# Patient Record
Sex: FEMALE | Race: WHITE | NOT HISPANIC OR LATINO | URBAN - METROPOLITAN AREA
[De-identification: names, ages, dates, MRNs, and addresses within clinical notes are randomized per-mention and may not be internally consistent; named-entity substitution may affect disease eponyms.]

---

## 2018-09-27 VITALS
SYSTOLIC BLOOD PRESSURE: 131 MMHG | HEIGHT: 64 IN | TEMPERATURE: 98 F | RESPIRATION RATE: 16 BRPM | HEART RATE: 68 BPM | WEIGHT: 139.33 LBS | DIASTOLIC BLOOD PRESSURE: 71 MMHG | OXYGEN SATURATION: 100 %

## 2018-09-27 NOTE — PRE-OP CHECKLIST - SELECT TESTS ORDERED
Urinalysis/CBC/PT/PTT/BMP/Results in MD note Urinalysis/HCG/Urine HCG Negative/BMP/CBC/PT/PTT/Results in MD note

## 2018-09-28 ENCOUNTER — INPATIENT (INPATIENT)
Facility: HOSPITAL | Age: 38
LOS: 1 days | Discharge: HOME | DRG: 742 | End: 2018-09-30
Attending: OBSTETRICS & GYNECOLOGY | Admitting: OBSTETRICS & GYNECOLOGY
Payer: COMMERCIAL

## 2018-09-28 DIAGNOSIS — Z98.890 OTHER SPECIFIED POSTPROCEDURAL STATES: Chronic | ICD-10-CM

## 2018-09-28 LAB
ANION GAP SERPL CALC-SCNC: 10 MMOL/L — SIGNIFICANT CHANGE UP (ref 5–17)
ANION GAP SERPL CALC-SCNC: 10 MMOL/L — SIGNIFICANT CHANGE UP (ref 5–17)
APTT BLD: 29.1 SEC — SIGNIFICANT CHANGE UP (ref 27.5–37.4)
BLD GP AB SCN SERPL QL: NEGATIVE — SIGNIFICANT CHANGE UP
BUN SERPL-MCNC: 13 MG/DL — SIGNIFICANT CHANGE UP (ref 7–23)
BUN SERPL-MCNC: 14 MG/DL — SIGNIFICANT CHANGE UP (ref 7–23)
CALCIUM SERPL-MCNC: 7.6 MG/DL — LOW (ref 8.4–10.5)
CALCIUM SERPL-MCNC: 7.8 MG/DL — LOW (ref 8.4–10.5)
CHLORIDE SERPL-SCNC: 102 MMOL/L — SIGNIFICANT CHANGE UP (ref 96–108)
CHLORIDE SERPL-SCNC: 102 MMOL/L — SIGNIFICANT CHANGE UP (ref 96–108)
CO2 SERPL-SCNC: 22 MMOL/L — SIGNIFICANT CHANGE UP (ref 22–31)
CO2 SERPL-SCNC: 24 MMOL/L — SIGNIFICANT CHANGE UP (ref 22–31)
CREAT SERPL-MCNC: 0.63 MG/DL — SIGNIFICANT CHANGE UP (ref 0.5–1.3)
CREAT SERPL-MCNC: 0.65 MG/DL — SIGNIFICANT CHANGE UP (ref 0.5–1.3)
FIBRINOGEN PPP-MCNC: 206 MG/DL — LOW (ref 258–438)
GLUCOSE SERPL-MCNC: 178 MG/DL — HIGH (ref 70–99)
GLUCOSE SERPL-MCNC: 189 MG/DL — HIGH (ref 70–99)
HCT VFR BLD CALC: 25.6 % — LOW (ref 34.5–45)
HGB BLD-MCNC: 8.4 G/DL — LOW (ref 11.5–15.5)
INR BLD: 1.17 — HIGH (ref 0.88–1.16)
MCHC RBC-ENTMCNC: 29.8 PG — SIGNIFICANT CHANGE UP (ref 27–34)
MCHC RBC-ENTMCNC: 32.8 G/DL — SIGNIFICANT CHANGE UP (ref 32–36)
MCV RBC AUTO: 90.8 FL — SIGNIFICANT CHANGE UP (ref 80–100)
PLATELET # BLD AUTO: 182 K/UL — SIGNIFICANT CHANGE UP (ref 150–400)
POTASSIUM SERPL-MCNC: 3.9 MMOL/L — SIGNIFICANT CHANGE UP (ref 3.5–5.3)
POTASSIUM SERPL-MCNC: 4.2 MMOL/L — SIGNIFICANT CHANGE UP (ref 3.5–5.3)
POTASSIUM SERPL-SCNC: 3.9 MMOL/L — SIGNIFICANT CHANGE UP (ref 3.5–5.3)
POTASSIUM SERPL-SCNC: 4.2 MMOL/L — SIGNIFICANT CHANGE UP (ref 3.5–5.3)
PROTHROM AB SERPL-ACNC: 13 SEC — HIGH (ref 9.8–12.7)
RBC # BLD: 2.82 M/UL — LOW (ref 3.8–5.2)
RBC # FLD: 12.4 % — SIGNIFICANT CHANGE UP (ref 10.3–16.9)
RH IG SCN BLD-IMP: POSITIVE — SIGNIFICANT CHANGE UP
SODIUM SERPL-SCNC: 134 MMOL/L — LOW (ref 135–145)
SODIUM SERPL-SCNC: 136 MMOL/L — SIGNIFICANT CHANGE UP (ref 135–145)
WBC # BLD: 19.4 K/UL — HIGH (ref 3.8–10.5)
WBC # FLD AUTO: 19.4 K/UL — HIGH (ref 3.8–10.5)

## 2018-09-28 RX ORDER — ONDANSETRON 8 MG/1
4 TABLET, FILM COATED ORAL EVERY 6 HOURS
Qty: 0 | Refills: 0 | Status: DISCONTINUED | OUTPATIENT
Start: 2018-09-28 | End: 2018-09-30

## 2018-09-28 RX ORDER — NALOXONE HYDROCHLORIDE 4 MG/.1ML
0.1 SPRAY NASAL
Qty: 0 | Refills: 0 | Status: DISCONTINUED | OUTPATIENT
Start: 2018-09-28 | End: 2018-09-30

## 2018-09-28 RX ORDER — BUPIVACAINE 13.3 MG/ML
20 INJECTION, SUSPENSION, LIPOSOMAL INFILTRATION ONCE
Qty: 0 | Refills: 0 | Status: DISCONTINUED | OUTPATIENT
Start: 2018-09-28 | End: 2018-09-30

## 2018-09-28 RX ORDER — ONDANSETRON 8 MG/1
4 TABLET, FILM COATED ORAL ONCE
Qty: 0 | Refills: 0 | Status: DISCONTINUED | OUTPATIENT
Start: 2018-09-28 | End: 2018-09-30

## 2018-09-28 RX ORDER — SODIUM CHLORIDE 9 MG/ML
1000 INJECTION, SOLUTION INTRAVENOUS
Qty: 0 | Refills: 0 | Status: DISCONTINUED | OUTPATIENT
Start: 2018-09-28 | End: 2018-09-30

## 2018-09-28 RX ORDER — KETOROLAC TROMETHAMINE 30 MG/ML
30 SYRINGE (ML) INJECTION EVERY 6 HOURS
Qty: 0 | Refills: 0 | Status: DISCONTINUED | OUTPATIENT
Start: 2018-09-28 | End: 2018-09-30

## 2018-09-28 RX ORDER — HYDROMORPHONE HYDROCHLORIDE 2 MG/ML
30 INJECTION INTRAMUSCULAR; INTRAVENOUS; SUBCUTANEOUS
Qty: 0 | Refills: 0 | Status: DISCONTINUED | OUTPATIENT
Start: 2018-09-28 | End: 2018-09-29

## 2018-09-28 RX ORDER — DOCUSATE SODIUM 100 MG
100 CAPSULE ORAL THREE TIMES A DAY
Qty: 0 | Refills: 0 | Status: DISCONTINUED | OUTPATIENT
Start: 2018-09-28 | End: 2018-09-30

## 2018-09-28 RX ORDER — METOCLOPRAMIDE HCL 10 MG
10 TABLET ORAL EVERY 6 HOURS
Qty: 0 | Refills: 0 | Status: DISCONTINUED | OUTPATIENT
Start: 2018-09-28 | End: 2018-09-30

## 2018-09-28 RX ORDER — SIMETHICONE 80 MG/1
80 TABLET, CHEWABLE ORAL EVERY 8 HOURS
Qty: 0 | Refills: 0 | Status: DISCONTINUED | OUTPATIENT
Start: 2018-09-28 | End: 2018-09-30

## 2018-09-28 RX ADMIN — Medication 30 MILLIGRAM(S): at 15:00

## 2018-09-28 RX ADMIN — Medication 30 MILLIGRAM(S): at 22:04

## 2018-09-28 RX ADMIN — HYDROMORPHONE HYDROCHLORIDE 30 MILLILITER(S): 2 INJECTION INTRAMUSCULAR; INTRAVENOUS; SUBCUTANEOUS at 15:26

## 2018-09-28 RX ADMIN — ONDANSETRON 4 MILLIGRAM(S): 8 TABLET, FILM COATED ORAL at 22:09

## 2018-09-28 RX ADMIN — Medication 30 MILLIGRAM(S): at 14:45

## 2018-09-28 RX ADMIN — SIMETHICONE 80 MILLIGRAM(S): 80 TABLET, CHEWABLE ORAL at 22:03

## 2018-09-28 RX ADMIN — Medication 30 MILLIGRAM(S): at 21:45

## 2018-09-28 NOTE — H&P ADULT - HISTORY OF PRESENT ILLNESS
37 yo  presents for scheduled myomectomy. Patient previously told by prior doctor to have only 3 fibroids on ultrasound. However, in past few days an MRI revealed >11 fibroids presents. Patient desires fertility. She has a history of an open myomectomy in .   Gynhx: denies  PMH: denes  PSH: open myomectomy   Meds: Valtrex prn  Allergic to amoxicillin--rash and diarrhea

## 2018-09-28 NOTE — H&P ADULT - ASSESSMENT
37 yo  here for scheduled myomectomy.  -Admitted to preop  -discussed abx with anesthesia--will give cefotetan for low cross reactivity w/ amoxicillin  -NPO  -IV hydration  -Cell saver available

## 2018-09-28 NOTE — PROGRESS NOTE ADULT - ASSESSMENT
38y Female POD#0    s/p abdominal myomectomy                                         1. Neuro/Pain:  PCA, d/c PCA in AM and transition to oral pain medications   2  CV:   VS per routine  3. Pulm: Encourage ISS  4. GI: tolerating sips of clears, ADAT    5. :  Mari in place, TOV in AM  6. Heme: CBC in PACU 8.4, repeat AM CBC  7. ID: --  8. DVT ppx: SCDs  9. Dispo: Likely POD#1 or 2

## 2018-09-28 NOTE — H&P ADULT - NSHPPHYSICALEXAM_GEN_ALL_CORE
Vital Signs Last 24 Hrs  T(C): --  T(F): --  HR: 68 (28 Sep 2018 15:00) (68 - 78)  BP: 98/58 (28 Sep 2018 15:00) (97/55 - 101/60)  BP(mean): 68 (28 Sep 2018 15:00) (66 - 73)  RR: 16 (28 Sep 2018 15:00) (11 - 16)  SpO2: 100% (28 Sep 2018 15:00) (100% - 100%)    Gen NAD, AOx3  Chest normal work of breathing  Abdomen: uterus palpable at umbilicus, lower abdomen slightly distended, nontender  Skin: 3 wheals left of umbilicus noted after induction of anesthesia but prior to antibiotic administration and skin prep  Extremities: no edema

## 2018-09-29 LAB
HCT VFR BLD CALC: 20.4 % — CRITICAL LOW (ref 34.5–45)
HGB BLD-MCNC: 6.5 G/DL — CRITICAL LOW (ref 11.5–15.5)
MCHC RBC-ENTMCNC: 29.1 PG — SIGNIFICANT CHANGE UP (ref 27–34)
MCHC RBC-ENTMCNC: 31.9 G/DL — LOW (ref 32–36)
MCV RBC AUTO: 91.5 FL — SIGNIFICANT CHANGE UP (ref 80–100)
PLATELET # BLD AUTO: 165 K/UL — SIGNIFICANT CHANGE UP (ref 150–400)
RBC # BLD: 2.23 M/UL — LOW (ref 3.8–5.2)
RBC # FLD: 12.7 % — SIGNIFICANT CHANGE UP (ref 10.3–16.9)
WBC # BLD: 15 K/UL — HIGH (ref 3.8–10.5)
WBC # FLD AUTO: 15 K/UL — HIGH (ref 3.8–10.5)

## 2018-09-29 RX ORDER — ACETAMINOPHEN 500 MG
650 TABLET ORAL ONCE
Qty: 0 | Refills: 0 | Status: COMPLETED | OUTPATIENT
Start: 2018-09-29 | End: 2018-09-29

## 2018-09-29 RX ORDER — ACETAMINOPHEN 500 MG
650 TABLET ORAL EVERY 6 HOURS
Qty: 0 | Refills: 0 | Status: DISCONTINUED | OUTPATIENT
Start: 2018-09-29 | End: 2018-09-30

## 2018-09-29 RX ADMIN — SIMETHICONE 80 MILLIGRAM(S): 80 TABLET, CHEWABLE ORAL at 21:15

## 2018-09-29 RX ADMIN — Medication 30 MILLIGRAM(S): at 04:21

## 2018-09-29 RX ADMIN — Medication 30 MILLIGRAM(S): at 20:49

## 2018-09-29 RX ADMIN — Medication 650 MILLIGRAM(S): at 22:14

## 2018-09-29 RX ADMIN — Medication 30 MILLIGRAM(S): at 03:20

## 2018-09-29 RX ADMIN — SIMETHICONE 80 MILLIGRAM(S): 80 TABLET, CHEWABLE ORAL at 14:00

## 2018-09-29 RX ADMIN — SODIUM CHLORIDE 125 MILLILITER(S): 9 INJECTION, SOLUTION INTRAVENOUS at 04:21

## 2018-09-29 RX ADMIN — SIMETHICONE 80 MILLIGRAM(S): 80 TABLET, CHEWABLE ORAL at 06:02

## 2018-09-29 RX ADMIN — Medication 30 MILLIGRAM(S): at 21:14

## 2018-09-29 RX ADMIN — Medication 30 MILLIGRAM(S): at 10:09

## 2018-09-29 RX ADMIN — Medication 10 MILLIGRAM(S): at 03:04

## 2018-09-29 RX ADMIN — Medication 650 MILLIGRAM(S): at 21:14

## 2018-09-29 RX ADMIN — ONDANSETRON 4 MILLIGRAM(S): 8 TABLET, FILM COATED ORAL at 06:02

## 2018-09-29 NOTE — PROGRESS NOTE ADULT - ASSESSMENT
38y Female POD# 1   s/p  abdominal myomectomy of approximately 24 fibroids.                                       1. Neuro/Pain:  pain well controlled w/ toradol, has not been using PCA, thus d/c PCA, and transition to tylenol and toradol only   2  CV:   VS per routine  3. Pulm: Encourage ISS  4. GI: tolerating clears, ADAT  5. :  s/p pearce, f/u TOV  6. Heme: PACU CBC 8.4, AM CBC 6.5, reported weakness early today, but symptoms have resolved, consented for blood and 2upRBC placed on hold, reevaluate if pRBC needed    7. ID: --  8. DVT ppx: SCDs  9. Dispo: Likely POD#2

## 2018-09-29 NOTE — PROVIDER CONTACT NOTE (CRITICAL VALUE NOTIFICATION) - SITUATION
Patient is 12 hours post op for abd myomectomy with removal of 24 fibroids  Hgb/Hct eleven hours prior = 8.4/25.6

## 2018-09-29 NOTE — PROVIDER CONTACT NOTE (CRITICAL VALUE NOTIFICATION) - ACTION/TREATMENT ORDERED:
Consent obtained from patient for Blood transfusion today. Form signed by Dr. Rosario , and Patient, and witnessed by MANUEL Cardenas.

## 2018-09-29 NOTE — PROVIDER CONTACT NOTE (CRITICAL VALUE NOTIFICATION) - ASSESSMENT
Nauseated  most of night treated with Reglan and Zofran; Dizzy upon standing at bedside for the first tie this morning.

## 2018-09-29 NOTE — PROGRESS NOTE ADULT - ASSESSMENT
39yo POD#1 s/p abdominal myomectomy, febrile   - patient now s/p 2 u PRBC due to anemia  - fever possibly secondary to blood transfusion vs. atelectasis vs UTI  - UA, urine culture to be collected at this time  - incentive spirometer use encouraged  - will administer tylenol PO and recheck temperature in 1 hour   - posttransfusion CBC at midnight

## 2018-09-30 VITALS
SYSTOLIC BLOOD PRESSURE: 125 MMHG | OXYGEN SATURATION: 100 % | DIASTOLIC BLOOD PRESSURE: 78 MMHG | RESPIRATION RATE: 18 BRPM | HEART RATE: 86 BPM | TEMPERATURE: 98 F

## 2018-09-30 LAB
APPEARANCE UR: CLEAR — SIGNIFICANT CHANGE UP
BILIRUB UR-MCNC: NEGATIVE — SIGNIFICANT CHANGE UP
COLOR SPEC: YELLOW — SIGNIFICANT CHANGE UP
DIFF PNL FLD: ABNORMAL
GLUCOSE UR QL: NEGATIVE — SIGNIFICANT CHANGE UP
HCT VFR BLD CALC: 25.2 % — LOW (ref 34.5–45)
HGB BLD-MCNC: 8.3 G/DL — LOW (ref 11.5–15.5)
KETONES UR-MCNC: NEGATIVE — SIGNIFICANT CHANGE UP
LEUKOCYTE ESTERASE UR-ACNC: NEGATIVE — SIGNIFICANT CHANGE UP
MCHC RBC-ENTMCNC: 29.2 PG — SIGNIFICANT CHANGE UP (ref 27–34)
MCHC RBC-ENTMCNC: 32.9 G/DL — SIGNIFICANT CHANGE UP (ref 32–36)
MCV RBC AUTO: 88.7 FL — SIGNIFICANT CHANGE UP (ref 80–100)
NITRITE UR-MCNC: NEGATIVE — SIGNIFICANT CHANGE UP
PH UR: 6 — SIGNIFICANT CHANGE UP (ref 5–8)
PLATELET # BLD AUTO: 151 K/UL — SIGNIFICANT CHANGE UP (ref 150–400)
PROT UR-MCNC: NEGATIVE MG/DL — SIGNIFICANT CHANGE UP
RBC # BLD: 2.84 M/UL — LOW (ref 3.8–5.2)
RBC # FLD: 13.6 % — SIGNIFICANT CHANGE UP (ref 10.3–16.9)
SP GR SPEC: <=1.005 — SIGNIFICANT CHANGE UP (ref 1–1.03)
UROBILINOGEN FLD QL: 0.2 E.U./DL — SIGNIFICANT CHANGE UP
WBC # BLD: 12.9 K/UL — HIGH (ref 3.8–10.5)
WBC # FLD AUTO: 12.9 K/UL — HIGH (ref 3.8–10.5)

## 2018-09-30 RX ORDER — OXYCODONE AND ACETAMINOPHEN 5; 325 MG/1; MG/1
1 TABLET ORAL EVERY 4 HOURS
Qty: 0 | Refills: 0 | Status: DISCONTINUED | OUTPATIENT
Start: 2018-09-30 | End: 2018-09-30

## 2018-09-30 RX ORDER — SODIUM CHLORIDE 9 MG/ML
3 INJECTION INTRAMUSCULAR; INTRAVENOUS; SUBCUTANEOUS EVERY 8 HOURS
Qty: 0 | Refills: 0 | Status: DISCONTINUED | OUTPATIENT
Start: 2018-09-30 | End: 2018-09-30

## 2018-09-30 RX ADMIN — OXYCODONE AND ACETAMINOPHEN 1 TABLET(S): 5; 325 TABLET ORAL at 11:35

## 2018-09-30 RX ADMIN — SIMETHICONE 80 MILLIGRAM(S): 80 TABLET, CHEWABLE ORAL at 06:23

## 2018-09-30 NOTE — DISCHARGE NOTE ADULT - HOSPITAL COURSE
Postoperative course complicated by anemia of 6.5, patient received 2u blood transfusion and hemoglobin nehemiah appropriately.  Patient discharged home in stable condition.

## 2018-09-30 NOTE — DISCHARGE NOTE ADULT - CARE PROVIDER_API CALL
Yariel Price), Obstetrics and Gynecology  [address] [place] [address]  Phone: [phone]  Fax: [phone]

## 2018-09-30 NOTE — DISCHARGE NOTE ADULT - PATIENT PORTAL LINK FT
You can access the [facility] Patient Portal, offered by [facility], by registering with the following website: [email or web address]

## 2018-09-30 NOTE — DISCHARGE NOTE ADULT - CARE PLAN
Principal Discharge DX:	H/O myomectomy  Goal:	Healthy recovery  Assessment and plan of treatment:	No heavy lifting, nothing per vagina, no swimming for 2 weeks.  Follow up with Dr. Price in office in 2 weeks.

## 2018-09-30 NOTE — PROGRESS NOTE ADULT - REASON FOR ADMISSION
status post abdominal myomectomy

## 2018-09-30 NOTE — DISCHARGE NOTE ADULT - PLAN OF CARE
Healthy recovery No heavy lifting, nothing per vagina, no swimming for 2 weeks.  Follow up with Dr. Price in office in 2 weeks.

## 2018-09-30 NOTE — PROGRESS NOTE ADULT - ASSESSMENT
38y Female POD# 2   s/p  abdominal myomectomy of approximately 24 fibroids.                                       1. Neuro/Pain:  pain well controlled w/ tylenol  2  CV:   VS per routine  3. Pulm: Encourage ISS  4. GI: tolerating clears, ADAT  5. :  s/p pearce, f/u TOV  6. Heme: patient received 2 units of blood overnight and hemoglobin nehemiah appropriately to 8.3; patient asymptomatic this morning.  7. ID: one time low grade fever overnight that subsequently resolved with tylenol and patient is asymptomatic; UA negative  8. DVT ppx: SCDs  9. Dispo: today

## 2018-10-01 PROCEDURE — 81001 URINALYSIS AUTO W/SCOPE: CPT

## 2018-10-01 PROCEDURE — 80048 BASIC METABOLIC PNL TOTAL CA: CPT

## 2018-10-01 PROCEDURE — 85730 THROMBOPLASTIN TIME PARTIAL: CPT

## 2018-10-01 PROCEDURE — 87086 URINE CULTURE/COLONY COUNT: CPT

## 2018-10-01 PROCEDURE — 85384 FIBRINOGEN ACTIVITY: CPT

## 2018-10-01 PROCEDURE — 36430 TRANSFUSION BLD/BLD COMPNT: CPT

## 2018-10-01 PROCEDURE — 88305 TISSUE EXAM BY PATHOLOGIST: CPT

## 2018-10-01 PROCEDURE — 86900 BLOOD TYPING SEROLOGIC ABO: CPT

## 2018-10-01 PROCEDURE — 86850 RBC ANTIBODY SCREEN: CPT

## 2018-10-01 PROCEDURE — 86923 COMPATIBILITY TEST ELECTRIC: CPT

## 2018-10-01 PROCEDURE — 85610 PROTHROMBIN TIME: CPT

## 2018-10-01 PROCEDURE — 86901 BLOOD TYPING SEROLOGIC RH(D): CPT

## 2018-10-01 PROCEDURE — C1765: CPT

## 2018-10-01 PROCEDURE — 85027 COMPLETE CBC AUTOMATED: CPT

## 2018-10-01 PROCEDURE — 36415 COLL VENOUS BLD VENIPUNCTURE: CPT

## 2018-10-01 PROCEDURE — P9016: CPT

## 2018-10-02 LAB
CULTURE RESULTS: SIGNIFICANT CHANGE UP
SPECIMEN SOURCE: SIGNIFICANT CHANGE UP

## 2018-10-03 LAB — SURGICAL PATHOLOGY STUDY: SIGNIFICANT CHANGE UP

## 2018-10-09 DIAGNOSIS — D25.0 SUBMUCOUS LEIOMYOMA OF UTERUS: ICD-10-CM

## 2018-10-09 DIAGNOSIS — D25.2 SUBSEROSAL LEIOMYOMA OF UTERUS: ICD-10-CM

## 2018-10-09 DIAGNOSIS — D62 ACUTE POSTHEMORRHAGIC ANEMIA: ICD-10-CM

## 2018-10-09 DIAGNOSIS — R50.9 FEVER, UNSPECIFIED: ICD-10-CM

## 2018-10-09 DIAGNOSIS — N83.201 UNSPECIFIED OVARIAN CYST, RIGHT SIDE: ICD-10-CM

## 2018-10-09 DIAGNOSIS — Z53.31 LAPAROSCOPIC SURGICAL PROCEDURE CONVERTED TO OPEN PROCEDURE: ICD-10-CM

## 2018-10-09 DIAGNOSIS — Z88.1 ALLERGY STATUS TO OTHER ANTIBIOTIC AGENTS STATUS: ICD-10-CM

## 2021-11-01 NOTE — BRIEF OPERATIVE NOTE - OPERATION/FINDINGS
1 week 20 cm fibroid uterus w/ 24 fibroids removed, endometrial cavity entered and patient should never labor, multiple subserosal and submucosal fibroids, r. ovarian hemorrhagic cyst

## 2022-05-06 NOTE — PROGRESS NOTE ADULT - SUBJECTIVE AND OBJECTIVE BOX
C/o incisional pain  AVSS  Abdomen soft, no rebound  Ext No C/C/E  Voiding freely  Hb 6.5, 8.4 last night probably dilutional    Plan  Transfuse 2 u prbc  Reg diet  May D/C home in AM
GYN POC   Patient seen at bedside in PACU. Was sleeping comfortable in bed. Pain well controlled with PCA. Tolerating sips.  No OOB yet.  Mari in place. Denies CP, palpitations, SOB, fever, chills, nausea, vomiting.    Vital Signs Last 24 Hrs  T(C): 36.9 (28 Sep 2018 19:00), Max: 36.9 (28 Sep 2018 19:00)  T(F): 98.5 (28 Sep 2018 19:00), Max: 98.5 (28 Sep 2018 19:00)  HR: 90 (28 Sep 2018 20:06) (68 - 92)  BP: 104/64 (28 Sep 2018 19:00) (97/55 - 107/63)  BP(mean): 77 (28 Sep 2018 19:00) (66 - 78)  RR: 16 (28 Sep 2018 20:06) (11 - 20)  SpO2: 99% (28 Sep 2018 20:06) (99% - 100%)    Physical Exam:  Gen: No Acute Distress  Pulm: Clear to auscultation bilaterally  GI: soft, appropriately tender, non-distended, +BS, no rebound, no guarding.  Dressing C/D/I  : Mari in place  Ext: SCDs in place, no edema/erythema/tenderness    I&O's Summary    28 Sep 2018 07:01  -  28 Sep 2018 20:23  --------------------------------------------------------  IN: 3185 mL / OUT: 1760 mL / NET: 1425 mL      MEDICATIONS  (STANDING):  BUpivacaine liposome 1.3% Injectable (no eMAR) 20 milliLiter(s) Local Injection Once  HYDROmorphone PCA (1 mG/mL) 30 milliLiter(s) PCA Continuous PCA Continuous  ketorolac   Injectable 30 milliGRAM(s) IV Push every 6 hours  lactated ringers. 1000 milliLiter(s) (125 mL/Hr) IV Continuous <Continuous>  simethicone 80 milliGRAM(s) Chew every 8 hours    MEDICATIONS  (PRN):  docusate sodium 100 milliGRAM(s) Oral three times a day PRN Stool Softening  metoclopramide Injectable 10 milliGRAM(s) IV Push every 6 hours PRN Nausea and/or Vomiting  naloxone Injectable 0.1 milliGRAM(s) IV Push every 3 minutes PRN For ANY of the following changes in patient status:  A. RR LESS THAN 10 breaths per minute, B. Oxygen saturation LESS THAN 90%, C. Sedation score of 6  ondansetron Injectable 4 milliGRAM(s) IV Push every 6 hours PRN Nausea  ondansetron Injectable 4 milliGRAM(s) IV Push once PRN Postoperative Nausea and/or Vomiting    Allergies    amoxicillin (Diarrhea; Rash)    Intolerances        LABS:                        8.4    19.4  )-----------( 182      ( 28 Sep 2018 18:44 )             25.6     09-28    136  |  102  |  14  ----------------------------<  178<H>  4.2   |  24  |  0.65    Ca    7.8<L>      28 Sep 2018 18:44      PT/INR - ( 28 Sep 2018 18:45 )   PT: 13.0 sec;   INR: 1.17          PTT - ( 28 Sep 2018 18:45 )  PTT:29.1 sec
GYN Progress Note    Patient seen at bedside this morning. Pearce was removed this morning, has not urinated this morning.   Pain is well controlled on toradol, only used PCA a few times overnight.   Tolerating clear liquids this morning. Overnight had nausea, improved with zofran. Has not had vomiting.   Ambulated this morning. States that when she first got up she felt weak, but has not felt weak since and feels well. States she went for a walk and had no SOB, weakness, CP, lightheaded.   Denies CP, palpitations, SOB, fever, chills, nausea, vomiting.    Vital Signs Last 24 Hrs  T(C): 37.3 (29 Sep 2018 05:41), Max: 37.3 (29 Sep 2018 05:41)  T(F): 99.1 (29 Sep 2018 05:41), Max: 99.1 (29 Sep 2018 05:41)  HR: 96 (29 Sep 2018 05:41) (68 - 96)  BP: 104/64 (29 Sep 2018 05:41) (97/55 - 116/69)  BP(mean): 72 (28 Sep 2018 20:12) (66 - 78)  RR: 17 (29 Sep 2018 05:41) (11 - 20)  SpO2: 100% (29 Sep 2018 05:41) (98% - 100%)    Physical Exam:  Gen: No Acute Distress, resting comfortable in bed  Pulm: Normal work of breathing, no wheezes/rhonchi/rales   GI: soft, appropriately tender, nondistended, +BS, no rebound, no guarding, Incision site clean/dry/intact   : s/p pearce, no vaginal bleeding   Ext: SCDs in place, wnl    I&O's Summary    28 Sep 2018 07:01  -  29 Sep 2018 07:00  --------------------------------------------------------  IN: 4435 mL / OUT: 2860 mL / NET: 1575 mL      MEDICATIONS  (STANDING):  BUpivacaine liposome 1.3% Injectable (no eMAR) 20 milliLiter(s) Local Injection Once  ketorolac   Injectable 30 milliGRAM(s) IV Push every 6 hours  lactated ringers. 1000 milliLiter(s) (125 mL/Hr) IV Continuous <Continuous>  simethicone 80 milliGRAM(s) Chew every 8 hours    MEDICATIONS  (PRN):  acetaminophen   Tablet .. 650 milliGRAM(s) Oral every 6 hours PRN Mild Pain (1 - 3)  docusate sodium 100 milliGRAM(s) Oral three times a day PRN Stool Softening  metoclopramide Injectable 10 milliGRAM(s) IV Push every 6 hours PRN Nausea and/or Vomiting  naloxone Injectable 0.1 milliGRAM(s) IV Push every 3 minutes PRN For ANY of the following changes in patient status:  A. RR LESS THAN 10 breaths per minute, B. Oxygen saturation LESS THAN 90%, C. Sedation score of 6  ondansetron Injectable 4 milliGRAM(s) IV Push every 6 hours PRN Nausea  ondansetron Injectable 4 milliGRAM(s) IV Push once PRN Postoperative Nausea and/or Vomiting      LABS:                        6.5    15.0  )-----------( 165      ( 29 Sep 2018 05:59 )             20.4     09-28    136  |  102  |  14  ----------------------------<  178<H>  4.2   |  24  |  0.65    Ca    7.8<L>      28 Sep 2018 18:44      PT/INR - ( 28 Sep 2018 18:45 )   PT: 13.0 sec;   INR: 1.17          PTT - ( 28 Sep 2018 18:45 )  PTT:29.1 sec
Patient evaluated at bedside after completion of second unit of blood due to fever of 100.5  Patient denies SOB, chest pain, lightheadedness.  Her only complaint at this time is that she "feels warm".  Reports that pain is well controlled and is voiding spontaneously.    T(F): 100.5 (09-29-18 @ 20:20), Max: 100.5 (09-29-18 @ 20:20)  HR: 116 (09-29-18 @ 20:20) (88 - 116)  BP: 130/82 (09-29-18 @ 20:20) (99/61 - 130/82)  RR: 18 (09-29-18 @ 20:20) (15 - 18)  SpO2: 100% (09-29-18 @ 20:20) (100% - 100%)  Wt(kg): --  I&O's Summary    28 Sep 2018 07:01  -  29 Sep 2018 07:00  --------------------------------------------------------  IN: 4435 mL / OUT: 2860 mL / NET: 1575 mL    29 Sep 2018 07:01  -  29 Sep 2018 21:25  --------------------------------------------------------  IN: 670 mL / OUT: 500 mL / NET: 170 mL        MEDICATIONS  (STANDING):  BUpivacaine liposome 1.3% Injectable (no eMAR) 20 milliLiter(s) Local Injection Once  ketorolac   Injectable 30 milliGRAM(s) IV Push every 6 hours  lactated ringers. 1000 milliLiter(s) (125 mL/Hr) IV Continuous <Continuous>  simethicone 80 milliGRAM(s) Chew every 8 hours    MEDICATIONS  (PRN):  acetaminophen   Tablet .. 650 milliGRAM(s) Oral every 6 hours PRN Mild Pain (1 - 3)  docusate sodium 100 milliGRAM(s) Oral three times a day PRN Stool Softening  metoclopramide Injectable 10 milliGRAM(s) IV Push every 6 hours PRN Nausea and/or Vomiting  naloxone Injectable 0.1 milliGRAM(s) IV Push every 3 minutes PRN For ANY of the following changes in patient status:  A. RR LESS THAN 10 breaths per minute, B. Oxygen saturation LESS THAN 90%, C. Sedation score of 6  ondansetron Injectable 4 milliGRAM(s) IV Push every 6 hours PRN Nausea  ondansetron Injectable 4 milliGRAM(s) IV Push once PRN Postoperative Nausea and/or Vomiting      Physical Exam:  Constitutional: NAD  Pulmonary: clear to auscultation bilaterally   Cardiovascular: tachycardic, regular rhythm, no murmurs  Abdomen: incision site clean, dry, intact. Soft, mildly tender, mildly distended, no guarding, no rebound  Extremities: no lower extremity edema or calve tenderness, edenilson sign negative    LABS:                        6.5    15.0  )-----------( 165      ( 29 Sep 2018 05:59 )             20.4     09-28    136  |  102  |  14  ----------------------------<  178<H>  4.2   |  24  |  0.65    Ca    7.8<L>      28 Sep 2018 18:44      PT/INR - ( 28 Sep 2018 18:45 )   PT: 13.0 sec;   INR: 1.17          PTT - ( 28 Sep 2018 18:45 )  PTT:29.1 sec
Pt seen and examined at bedside. Pt reports abdominal pain is well controlled. Pt is ambulating, tolerating clear diet, passing flatus, and urinating adequately.   Pt denies fever, chills, chest pain, SOB, nausea, vomiting, lightheadedness, dizziness.    Patient was febrile one time overnight upon completion of the second unit of blood, but after receiving tylenol was afebrile and asymptomatic.  Patient is requesting to be discharged today and overall feels well.      T(F): 99.9 (09-30-18 @ 05:44), Max: 100.5 (09-29-18 @ 20:20)  HR: 98 (09-30-18 @ 05:44) (98 - 116)  BP: 127/78 (09-30-18 @ 05:44) (99/61 - 130/82)  RR: 17 (09-30-18 @ 05:44) (15 - 18)  SpO2: 100% (09-30-18 @ 05:44) (99% - 100%)  Wt(kg): --  I&O's Summary    29 Sep 2018 07:01  -  30 Sep 2018 07:00  --------------------------------------------------------  IN: 2120 mL / OUT: 1300 mL / NET: 820 mL        MEDICATIONS  (STANDING):  BUpivacaine liposome 1.3% Injectable (no eMAR) 20 milliLiter(s) Local Injection Once  ketorolac   Injectable 30 milliGRAM(s) IV Push every 6 hours  simethicone 80 milliGRAM(s) Chew every 8 hours  sodium chloride 0.9% lock flush 3 milliLiter(s) IV Push every 8 hours    MEDICATIONS  (PRN):  acetaminophen   Tablet .. 650 milliGRAM(s) Oral every 6 hours PRN Mild Pain (1 - 3)  docusate sodium 100 milliGRAM(s) Oral three times a day PRN Stool Softening  metoclopramide Injectable 10 milliGRAM(s) IV Push every 6 hours PRN Nausea and/or Vomiting  naloxone Injectable 0.1 milliGRAM(s) IV Push every 3 minutes PRN For ANY of the following changes in patient status:  A. RR LESS THAN 10 breaths per minute, B. Oxygen saturation LESS THAN 90%, C. Sedation score of 6  ondansetron Injectable 4 milliGRAM(s) IV Push every 6 hours PRN Nausea  ondansetron Injectable 4 milliGRAM(s) IV Push once PRN Postoperative Nausea and/or Vomiting      Physical Exam:  Constitutional: NAD  Pulmonary: clear to auscultation bilaterally   Cardiovascular: Regular rate and rhythm   Abdomen: incision site clean, dry, intact. Soft, mildly tender,  nondistended, no guarding, no rebound, +bowel sounds  Extremities: no lower extremity edema or calve tenderness. SCDs in place     LABS:                        8.3    12.9  )-----------( 151      ( 30 Sep 2018 00:42 )             25.2     09-28    136  |  102  |  14  ----------------------------<  178<H>  4.2   |  24  |  0.65    Ca    7.8<L>      28 Sep 2018 18:44      PT/INR - ( 28 Sep 2018 18:45 )   PT: 13.0 sec;   INR: 1.17          PTT - ( 28 Sep 2018 18:45 )  PTT:29.1 sec  Urinalysis Basic - ( 30 Sep 2018 01:18 )    Color: Yellow / Appearance: Clear / SG: <=1.005 / pH: x  Gluc: x / Ketone: NEGATIVE  / Bili: Negative / Urobili: 0.2 E.U./dL   Blood: x / Protein: NEGATIVE mg/dL / Nitrite: NEGATIVE   Leuk Esterase: NEGATIVE / RBC: < 5 /HPF / WBC < 5 /HPF   Sq Epi: x / Non Sq Epi: 0-5 /HPF / Bacteria: Present /HPF
No
